# Patient Record
Sex: MALE | URBAN - METROPOLITAN AREA
[De-identification: names, ages, dates, MRNs, and addresses within clinical notes are randomized per-mention and may not be internally consistent; named-entity substitution may affect disease eponyms.]

---
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
## 2022-01-24 ENCOUNTER — DASHBOARD ENCOUNTERS (OUTPATIENT)
Age: 22
End: 2022-01-24

## 2022-01-26 ENCOUNTER — OFFICE VISIT (OUTPATIENT)
Dept: URBAN - METROPOLITAN AREA CLINIC 35 | Facility: CLINIC | Age: 22
End: 2022-01-26

## 2022-01-26 NOTE — HPI-WEIGHT LOSS
Patient presents today for a follow-up office visit from 11/03/2021. Labs were completed on 11/09/2021 and the results are in EMR. He is/is not drinking Ensure/Boost, once a day. Patient's weight was --- in the office today. This is --- from 118 pounds at his last office visit. Patient states that his appetite has been ---.